# Patient Record
Sex: FEMALE | Race: BLACK OR AFRICAN AMERICAN
[De-identification: names, ages, dates, MRNs, and addresses within clinical notes are randomized per-mention and may not be internally consistent; named-entity substitution may affect disease eponyms.]

---

## 2017-01-18 ENCOUNTER — HOSPITAL ENCOUNTER (EMERGENCY)
Dept: HOSPITAL 17 - NEPE | Age: 42
Discharge: HOME | End: 2017-01-18
Payer: COMMERCIAL

## 2017-01-18 VITALS
RESPIRATION RATE: 24 BRPM | TEMPERATURE: 98.2 F | SYSTOLIC BLOOD PRESSURE: 140 MMHG | DIASTOLIC BLOOD PRESSURE: 96 MMHG | OXYGEN SATURATION: 94 % | HEART RATE: 70 BPM

## 2017-01-18 VITALS — WEIGHT: 279.99 LBS | HEIGHT: 66 IN | BODY MASS INDEX: 45 KG/M2

## 2017-01-18 VITALS — RESPIRATION RATE: 22 BRPM | OXYGEN SATURATION: 98 %

## 2017-01-18 VITALS — SYSTOLIC BLOOD PRESSURE: 132 MMHG | DIASTOLIC BLOOD PRESSURE: 85 MMHG

## 2017-01-18 DIAGNOSIS — R11.10: ICD-10-CM

## 2017-01-18 DIAGNOSIS — I10: ICD-10-CM

## 2017-01-18 DIAGNOSIS — R07.89: ICD-10-CM

## 2017-01-18 DIAGNOSIS — Z87.09: ICD-10-CM

## 2017-01-18 DIAGNOSIS — Z87.42: ICD-10-CM

## 2017-01-18 DIAGNOSIS — J20.9: Primary | ICD-10-CM

## 2017-01-18 DIAGNOSIS — Z72.0: ICD-10-CM

## 2017-01-18 PROCEDURE — 94640 AIRWAY INHALATION TREATMENT: CPT

## 2017-01-18 PROCEDURE — 94664 DEMO&/EVAL PT USE INHALER: CPT

## 2017-01-18 PROCEDURE — 71020: CPT

## 2017-01-18 PROCEDURE — 96372 THER/PROPH/DIAG INJ SC/IM: CPT

## 2017-01-18 PROCEDURE — 99283 EMERGENCY DEPT VISIT LOW MDM: CPT

## 2017-01-18 RX ADMIN — IPRATROPIUM BROMIDE AND ALBUTEROL SULFATE SCH AMPULE: .5; 3 SOLUTION RESPIRATORY (INHALATION) at 16:49

## 2017-01-18 RX ADMIN — IPRATROPIUM BROMIDE AND ALBUTEROL SULFATE SCH AMPULE: .5; 3 SOLUTION RESPIRATORY (INHALATION) at 16:50

## 2017-01-18 NOTE — RADRPT
EXAM DATE/TIME:  01/18/2017 17:50 

 

[facility] COMPARISON:     

No previous studies available for comparison.

 

                     

INDICATIONS :     

Short of breath.

                     

 

MEDICAL HISTORY :     

None.          

 

SURGICAL HISTORY :     

None.   

 

ENCOUNTER:     

Initial                                        

 

ACUITY:     

3 days      

 

PAIN SCORE:     

5/10

 

LOCATION:     

Left chest 

 

FINDINGS:     

PA and lateral views of the chest demonstrate the lungs to be symmetrically aerated without evidence 
of mass, infiltrate or effusion.  The cardiomediastinal contours are unremarkable.  Osseous structure
s are intact.

 

CONCLUSION:     No acute disease.  

 

 

 

 Allan Mi MD on January 18, 2017 at 18:11           

Board Certified Radiologist.

 This report was verified electronically.

## 2017-01-18 NOTE — PD
HPI


Chief Complaint:  Cold / Flu Symptoms


Time Seen by Provider:  16:40


Travel History


International Travel<30 days:  No


Contact w/Intl Traveler<30days:  No


Traveled to known affect area:  No





History of Present Illness


HPI


Patient is a 41-year-old female who presents emergency for evaluation of cough, 

chest congestion, chest tightness.  Patient states that the symptoms started on 

Monday.  She states that she has vomited a few times with coughing spells, 

additionally she has vomited with the coughing spells.  She reports yellow to 

green mucus production, sinus pressure.  Patient denies any fevers but reports 

chills.  Patient endorses a 26 year smoking history.  She denies any diagnosis 

of COPD or asthma but states she gets frequent bronchitis.





PFSH


Past Medical History


Diminished Hearing:  No


Gastrointestinal Disorders:  No


Hypertension:  Yes


Reproductive:  Yes (GENITAL HERPES, PELVIC INFLAMMATORY DISEASE)


Respiratory:  Yes (BRONCHITIS)


Immunizations Current:  Yes


Pregnant?:  Not Pregnant


LMP:  17


:  3


Para:  2


Miscarriage:  0


:  1


Tubal Ligation:  Yes





Past Surgical History


 Section:  Yes


Other Surgery:  Yes (SPINAL STEROID INJECTION)





Social History


Alcohol Use:  Yes (occasional)


Tobacco Use:  Yes


Substance Use:  Yes (marijuana)





Allergies-Medications


(Allergen,Severity, Reaction):  


Coded Allergies:  


     Dilaudid (Verified  Allergy, Severe, THROAT SWELLING, HIVES, 17)


Reported Meds & Prescriptions





Reported Meds & Active Scripts


Active


Proventil Hfa 6.7 GM Inh (Albuterol Sulfate) 90 Mcg/Act Aer 2 Puff INH Q6H PRN


Zithromax (Azithromycin) 250 Mg Tab 250 Mg PO AS DIRECTED


     Take 2 tabs (500 mg) on day 1 then 1 tab daily x 4 days.








Review of Systems


Except as stated in HPI:  all other systems reviewed are Neg


General / Constitutional:  Positive: Chills,  No: Fever


Eyes:  No: Visual changes


HENT:  Positive: Congestion,  No: Headaches


Cardiovascular:  No: Chest Pain or Discomfort


Respiratory:  Positive: Cough, Shortness of Breath, Wheezing


Gastrointestinal:  Positive: Vomiting,  No: Nausea, Diarrhea, Abdominal Pain


Musculoskeletal:  No: Myalgias


Neurologic:  No: Dizziness, Syncope





Physical Exam


Narrative


GENERAL: Overweight, well-developed, alert  female.  Resting 

comfortably in no acute distress.


SKIN: Warm and dry.


HEAD: Atraumatic. Normocephalic. 


EYES: Pupils equal and round. No scleral icterus. No injection or drainage. 


ENT: No nasal bleeding or discharge.  Mucous membranes pink and moist.


NECK: Trachea midline. No JVD. 


CARDIOVASCULAR: Regular rate and rhythm.  


RESPIRATORY:  Expiratory wheezing throughout, no nasal flaring, no accessory 

muscle use, no retractions.


GASTROINTESTINAL: Abdomen soft, non-tender, nondistended. Hepatic and splenic 

margins not palpable. 


MUSCULOSKELETAL: Extremities without clubbing, cyanosis, or edema. No obvious 

deformities. 


NEUROLOGICAL: Awake and alert. No obvious cranial nerve deficits.  Motor 

grossly within normal limits. Five out of 5 muscle strength in the arms and 

legs.  Normal speech.


PSYCHIATRIC: Appropriate mood and affect; insight and judgment normal.





Data


Data


Last Documented VS





Vital Signs








  Date Time  Temp Pulse Resp B/P Pulse Ox O2 Delivery O2 Flow Rate FiO2


 


17 16:46   22  98 Room Air  


 


17 14:19 98.2 70  140/96    








Orders





 Iv Access Insert/Monitor (17 16:37)


Ecg Monitoring (17 16:37)


Oximetry (17 16:37)


Oxygen Administration (17 16:37)


Chest, Pa & Lat (17 16:37)


Methylprednisolone So Succ Inj (Solumedr (17 16:45)


Albuterol-Ipratropium Neb (Duoneb Neb) (17 16:45)








MDM


Medical Decision Making


Medical Screen Exam Complete:  Yes


Emergency Medical Condition:  Yes


Interpretation(s)





Vital Signs








  Date Time  Temp Pulse Resp B/P Pulse Ox O2 Delivery O2 Flow Rate FiO2


 


17 14:19 98.2 70 24 140/96 94 Room Air  








Differential Diagnosis


Bronchitis versus pneumonia versus viral syndrome versus other


Narrative Course


Patient is a 41-year-old female who presents emergency department for 

evaluation of cough and chest congestion.  Symptoms started 2 days ago.  

Patient is afebrile, her vital signs are stable, she is well oxygenated on room 

air with a sat of 98%.  Chest x-ray shows no acute disease, patient was given 

DuoNeb 3 and reports improvement in her symptoms.  She was also given Solu-

Medrol IM.  Patient was encouraged to avoid further tobacco use, she was given 

prescriptions for albuterol nebulizers, and nebulizer machine, prednisone, and 

amoxicillin.  She was encouraged to follow-up with her primary doctor in 24-48 

hours.  She was encouraged to return to emergency department for any new or 

worsening symptoms.  Patient verbalized understanding of these instructions.  

Patient stable for discharge.





Diagnosis





 Primary Impression:  


 Acute bronchitis


 Qualified Code:  J20.9 - Acute bronchitis, unspecified organism


Referrals:  


Primary Care Physician


2 days


Patient Instructions:  Acute Bronchitis (ED), General Instructions





***Additional Instructions:


Follow-up with your primary doctor


Avoid tobacco use or marijuana use


Take medications as directed


Return to emergency department for any new or worsening symptoms


***Med/Other Pt SpecificInfo:  Prescription(s) given


Scripts


Amoxicillin 875 Mg Ybo352 Mg PO BID  10 Days  Ref 0


   Prov:Joanne BacaP         17 


Prednisone 50 Mg Tab50 Mg PO DAILY  #5 TAB  Ref 0


   Prov:Joanne Baca         17 


Albuterol Neb 0.63 Mg/3 Ml Neb0.63 Mg NEB QID NEB PRN (SHORTNESS OF BREATH) #

125 NEBULE  Ref 0


   Prov:Joanne Baca         17 


Nebulizer/Adult Mask 1 Kit Kit #1 KIT .ROUTE AS DIRECTED   Ref 0


   Prov:Joanne Baca         17 


Nebulizer 1 Mis Mis #1 EA .ROUTE AS DIRECTED   Ref 0


   Prov:Joanne Baca         17








Joanne Baca 2017 16:45

## 2017-10-20 ENCOUNTER — HOSPITAL ENCOUNTER (EMERGENCY)
Dept: HOSPITAL 17 - NED | Age: 42
Discharge: LEFT BEFORE DISCHARGE | End: 2017-10-20
Payer: SELF-PAY

## 2017-10-20 VITALS
OXYGEN SATURATION: 98 % | RESPIRATION RATE: 14 BRPM | SYSTOLIC BLOOD PRESSURE: 140 MMHG | DIASTOLIC BLOOD PRESSURE: 98 MMHG | TEMPERATURE: 98.2 F | HEART RATE: 80 BPM

## 2017-10-20 VITALS — BODY MASS INDEX: 42.52 KG/M2 | HEIGHT: 66 IN | WEIGHT: 264.55 LBS

## 2017-10-20 DIAGNOSIS — R68.89: Primary | ICD-10-CM

## 2017-10-20 PROCEDURE — 99281 EMR DPT VST MAYX REQ PHY/QHP: CPT

## 2017-11-23 ENCOUNTER — HOSPITAL ENCOUNTER (EMERGENCY)
Dept: HOSPITAL 17 - NEPK | Age: 42
Discharge: HOME | End: 2017-11-23
Payer: SELF-PAY

## 2017-11-23 VITALS
SYSTOLIC BLOOD PRESSURE: 166 MMHG | HEART RATE: 76 BPM | TEMPERATURE: 98 F | DIASTOLIC BLOOD PRESSURE: 114 MMHG | OXYGEN SATURATION: 97 % | RESPIRATION RATE: 20 BRPM

## 2017-11-23 VITALS — BODY MASS INDEX: 35.43 KG/M2 | HEIGHT: 66 IN | WEIGHT: 220.46 LBS

## 2017-11-23 DIAGNOSIS — Z79.51: ICD-10-CM

## 2017-11-23 DIAGNOSIS — Z79.899: ICD-10-CM

## 2017-11-23 DIAGNOSIS — R07.9: ICD-10-CM

## 2017-11-23 DIAGNOSIS — I10: ICD-10-CM

## 2017-11-23 DIAGNOSIS — N73.9: ICD-10-CM

## 2017-11-23 DIAGNOSIS — J20.9: Primary | ICD-10-CM

## 2017-11-23 DIAGNOSIS — Z88.5: ICD-10-CM

## 2017-11-23 DIAGNOSIS — Z72.0: ICD-10-CM

## 2017-11-23 PROCEDURE — 99284 EMERGENCY DEPT VISIT MOD MDM: CPT

## 2017-11-23 PROCEDURE — 96372 THER/PROPH/DIAG INJ SC/IM: CPT

## 2017-11-23 NOTE — PD
HPI


Chief Complaint:  Cold / Flu Symptoms


Time Seen by Provider:  12:09


Travel History


International Travel<30 days:  No


Contact w/Intl Traveler<30days:  No


Traveled to known affect area:  No





History of Present Illness


HPI


43 yo F complains of cough dry and wet for 45 days, white pasty phlegm at 

times. Slight chest pain from coughing. This morning pt coughed quite 

frequently for 6 hours today. Occasional dyspnea. PT reports hx bronchitis. + 

Fever to 103 yesterday. + Smoking until quitting 5 days prior. + Bilateral ST x 

45 days.





PFSH


Past Medical History


Cardiovascular Problems:  Yes (HTN)


Diminished Hearing:  No


Gastrointestinal Disorders:  No


Genitourinary:  Yes (pid)


Hypertension:  Yes


Reproductive:  Yes (GENITAL HERPES, PELVIC INFLAMMATORY DISEASE)


Respiratory:  Yes (BRONCHITIS)


Immunizations Current:  Yes


Pregnant?:  Not Pregnant


:  3


Para:  2


Miscarriage:  0


:  1


Tubal Ligation:  Yes





Past Surgical History


 Section:  Yes


Other Surgery:  Yes (SPINAL STEROID INJECTION)





Social History


Alcohol Use:  Yes (occasional)


Tobacco Use:  Yes


Substance Use:  Yes (marijuana)





Allergies-Medications


(Allergen,Severity, Reaction):  


Coded Allergies:  


     hydromorphone (Unverified  Allergy, Severe, THROAT SWELLING, HIVES, 8/15/17

)


Reported Meds & Prescriptions





Reported Meds & Active Scripts


Active


Tessalon Perles (Benzonatate) 100 Mg Cap 100 Mg PO TID PRN


Prednisone 50 Mg Tab 50 Mg PO DAILY


Proventil Hfa 6.7 GM Inh (Albuterol Sulfate) 90 Mcg/Act Aer 2 Puff INH Q6H PRN


Zithromax (Azithromycin) 250 Mg Tab 250 Mg PO AS DIRECTED


     Take 2 tabs (500 mg) on day 1 then 1 tab daily x 4 days.


Amoxicillin 875 Mg Tab 875 Mg PO BID 10 Days


Albuterol Neb (Albuterol Sulfate) 0.63 Mg/3 Ml Neb 0.63 Mg NEB QID NEB PRN


Nebulizer/Adult Mask (N/A) 1 Kit Kit 1 Kit .ROUTE AS DIRECTED


Nebulizer 1 Mis Mis 1 Ea .ROUTE AS DIRECTED








Review of Systems


General / Constitutional:  Positive: Fever


HENT:  Positive: Sore Throat


Respiratory:  Positive: Cough, Wheezing





Physical Exam


Narrative


GENERAL: 43 yo F, WNWD, NAD


SKIN: Warm and dry.


HEAD: Normocephalic.


THROAT: No tonsillar exudate or erythema. No PTA. 


EYES: No scleral icterus. No injection or drainage. 


NECK: Supple, trachea midline. No JVD or lymphadenopathy.


CARDIOVASCULAR: Regular rate and rhythm without murmurs, gallops, or rubs. 


RESPIRATORY: Breath sounds equal bilaterally. No accessory muscle use. Trace 

wheezing bilaterally.





Data


Data


Last Documented VS





Vital Signs








  Date Time  Temp Pulse Resp B/P (MAP) Pulse Ox O2 Delivery O2 Flow Rate FiO2


 


17 12:44        


 


17 12:05   16  99 Room Air  


 


17 11:33 98.0 76      





VS reviewed


Orders





 Orders


Ed Discharge Order (17 12:18)


Dexamethasone Inj (Decadron Inj) (17 12:30)


Azithromycin (Zithromax) (17 12:30)


Albuterol Hfa Inh (Proair Hfa Inh) (17 12:30)








MDM


Medical Decision Making


Medical Screen Exam Complete:  Yes


Emergency Medical Condition:  Yes


Medical Record Reviewed:  Yes


Differential Diagnosis


bronchitis, asthma, COPD, pna


Narrative Course


presentation concerning for atypical pneumonia or copd/asthma type process


PE/ACS, etc are considered reasonably safely excluded


Pharynigits likely postnasal drip 


Pt encouraged to continue smoking cessation efforts


scripts as below


return precautions discussed





Diagnosis





 Primary Impression:  


 Acute bronchitis


 Qualified Codes:  J20.9 - Acute bronchitis, unspecified


 Additional Impression:  


 Pharyngitis


 Qualified Codes:  J02.9 - Acute pharyngitis, unspecified


Referrals:  


[facility]


2 days


***Med/Other Pt SpecificInfo:  Prescription(s) given


Scripts


Benzonatate (Tessalon Perles) 100 Mg Cap


100 MG PO TID Y for COUGH, #10 CAP 0 Refills


   Prov: Bigg Nava MD         17 


Albuterol 6.7 GM Inh (Proventil Hfa 6.7 GM Inh) 90 Mcg/Act Aer


2 PUFF INH Q6H Y for SHORTNESS OF BREATH, #1 INHALER 0 Refills


   Prov: Bigg Nava MD         17 


Azithromycin (Zithromax) 250 Mg Tab


250 MG PO AS DIRECTED for Infection, #6 TAB 0 Refills


   Take 2 tabs (500 mg) on day 1 then 1 tab daily x 4 days.


   Prov: Bigg Nava MD         17


Disposition:  01 DISCHARGE HOME


Condition:  Stable











Bigg Nava MD 2017 12:16

## 2018-01-10 ENCOUNTER — HOSPITAL ENCOUNTER (EMERGENCY)
Dept: HOSPITAL 17 - NED | Age: 43
Discharge: LEFT BEFORE DISCHARGE | End: 2018-01-10
Payer: SELF-PAY

## 2018-01-10 VITALS — BODY MASS INDEX: 43.4 KG/M2 | HEIGHT: 66 IN | WEIGHT: 270.07 LBS

## 2018-01-10 VITALS
TEMPERATURE: 99.1 F | HEART RATE: 84 BPM | DIASTOLIC BLOOD PRESSURE: 103 MMHG | SYSTOLIC BLOOD PRESSURE: 177 MMHG | RESPIRATION RATE: 18 BRPM | OXYGEN SATURATION: 98 %

## 2018-01-10 DIAGNOSIS — I10: ICD-10-CM

## 2018-01-10 DIAGNOSIS — R11.0: ICD-10-CM

## 2018-01-10 DIAGNOSIS — R51: Primary | ICD-10-CM

## 2018-01-10 DIAGNOSIS — R09.89: ICD-10-CM

## 2018-01-10 DIAGNOSIS — R05: ICD-10-CM

## 2018-01-10 PROCEDURE — 99281 EMR DPT VST MAYX REQ PHY/QHP: CPT

## 2018-01-10 NOTE — PD
HPI


Chief Complaint:  Cold / Flu Symptoms


Time Seen by Provider:  13:56


Travel History


International Travel<30 days:  No


Contact w/Intl Traveler<30days:  No


Traveled to known affect area:  No





History of Present Illness


HPI


Pt is a 42-year-old female presenting to emergency department evaluation of 

cough, headache, nausea, diarrhea and chest congestion.  Patient states his 

symptoms started .  She reports the max temp 102 at 10:30 last night.  

She denies any abdominal pain, chest pain.  Patient is taken any over-the-

counter medications today area and her symptoms started gradually, worsening 

which is what prompted her visit to the emergency department.  There are no 

alleviating factors, there are no exacerbating factors.





PFSH


Past Medical History


Cardiovascular Problems:  Yes (HTN)


Diminished Hearing:  No


Gastrointestinal Disorders:  No


Genitourinary:  Yes (pid)


Hypertension:  Yes


Reproductive:  Yes (GENITAL HERPES, PELVIC INFLAMMATORY DISEASE)


Respiratory:  Yes (BRONCHITIS)


Immunizations Current:  Yes


Pregnant?:  Not Pregnant


LMP:  now


:  3


Para:  2


Miscarriage:  0


:  1


Tubal Ligation:  Yes





Past Surgical History


 Section:  Yes


Other Surgery:  Yes (SPINAL STEROID INJECTION)





Social History


Alcohol Use:  Yes (occasional)


Tobacco Use:  Yes


Substance Use:  Yes (marijuana)





Allergies-Medications


(Allergen,Severity, Reaction):  


Coded Allergies:  


     hydromorphone (Unverified  Allergy, Severe, THROAT SWELLING, HIVES, 8/15/17

)


Reported Meds & Prescriptions





Reported Meds & Active Scripts


Active


Tessalon Perles (Benzonatate) 100 Mg Cap 100 Mg PO TID PRN


Proventil Hfa 6.7 GM Inh (Albuterol Sulfate) 90 Mcg/Act Aer 2 Puff INH Q6H PRN


Zithromax (Azithromycin) 250 Mg Tab 250 Mg PO AS DIRECTED


     Take 2 tabs (500 mg) on day 1 then 1 tab daily x 4 days.


Amoxicillin 875 Mg Tab 875 Mg PO BID 10 Days


Albuterol Neb (Albuterol Sulfate) 0.63 Mg/3 Ml Neb 0.63 Mg NEB QID NEB PRN


Nebulizer/Adult Mask (N/A) 1 Kit Kit 1 Kit .ROUTE AS DIRECTED


Nebulizer 1 Mis Mis 1 Ea .ROUTE AS DIRECTED








Review of Systems


Except as stated in HPI:  all other systems reviewed are Neg


General / Constitutional:  Positive: Fever, Chills


HENT:  Positive: Headaches, Congestion


Cardiovascular:  No: Chest Pain or Discomfort


Respiratory:  Positive: Cough


Gastrointestinal:  No: Nausea, Vomiting, Abdominal Pain


Musculoskeletal:  Positive: Myalgias





Physical Exam


Narrative


GENERAL: Well-developed, well-nourished, alert female.  Presenting in no acute 

distress


SKIN: Warm and dry.


HEAD: Normocephalic.


EYES: No scleral icterus. No injection or drainage. 


CARDIOVASCULAR: Regular rate 


RESPIRATORY: No accessory muscle use.





Data


Data


Last Documented VS





Vital Signs








  Date Time  Temp Pulse Resp B/P (MAP) Pulse Ox O2 Delivery O2 Flow Rate FiO2


 


1/10/18 16:47        


 


1/10/18 13:33 99.1 84 18  98 Room Air  











MDM


Medical Decision Making


Medical Screen Exam Complete:  Yes


Emergency Medical Condition:  Yes


Interpretation(s)





Vital Signs








  Date Time  Temp Pulse Resp B/P (MAP) Pulse Ox O2 Delivery O2 Flow Rate FiO2


 


1/10/18 16:47        


 


1/10/18 13:33 99.1 84 18 177/103 (127) 98 Room Air  








Differential Diagnosis


Viral syndrome versus influenza versus strep pharyngitis versus other


Narrative Course


Patient is a 42-year-old female presenting to the emergency department for 

evaluation of cold of flu symptoms.  Patient is awaiting bed placement.





She was called to be bedded, she was no longer in the emergency department.  

Patient left AMA





Diagnosis





 Primary Impression:  


 Left against medical advice











Joanne Baca Arnold 10, 2018 17:29

## 2018-01-28 ENCOUNTER — HOSPITAL ENCOUNTER (EMERGENCY)
Dept: HOSPITAL 17 - NEPD | Age: 43
Discharge: HOME | End: 2018-01-28
Payer: SELF-PAY

## 2018-01-28 VITALS
TEMPERATURE: 98.1 F | SYSTOLIC BLOOD PRESSURE: 130 MMHG | OXYGEN SATURATION: 97 % | HEART RATE: 80 BPM | DIASTOLIC BLOOD PRESSURE: 96 MMHG | RESPIRATION RATE: 15 BRPM

## 2018-01-28 VITALS — HEIGHT: 66 IN | WEIGHT: 286.6 LBS | BODY MASS INDEX: 46.06 KG/M2

## 2018-01-28 VITALS — OXYGEN SATURATION: 98 %

## 2018-01-28 DIAGNOSIS — J06.9: Primary | ICD-10-CM

## 2018-01-28 DIAGNOSIS — Z72.0: ICD-10-CM

## 2018-01-28 LAB
BASOPHILS # BLD AUTO: 0.1 TH/MM3 (ref 0–0.2)
BASOPHILS NFR BLD: 0.8 % (ref 0–2)
BUN SERPL-MCNC: 9 MG/DL (ref 7–18)
CALCIUM SERPL-MCNC: 8.7 MG/DL (ref 8.5–10.1)
CHLORIDE SERPL-SCNC: 104 MEQ/L (ref 98–107)
CREAT SERPL-MCNC: 0.78 MG/DL (ref 0.5–1)
EOSINOPHIL # BLD: 0 TH/MM3 (ref 0–0.4)
EOSINOPHIL NFR BLD: 0.4 % (ref 0–4)
ERYTHROCYTE [DISTWIDTH] IN BLOOD BY AUTOMATED COUNT: 15.6 % (ref 11.6–17.2)
GFR SERPLBLD BASED ON 1.73 SQ M-ARVRAT: 98 ML/MIN (ref 89–?)
GLUCOSE SERPL-MCNC: 104 MG/DL (ref 74–106)
HCO3 BLD-SCNC: 25.5 MEQ/L (ref 21–32)
HCT VFR BLD CALC: 39.4 % (ref 35–46)
HGB BLD-MCNC: 13.1 GM/DL (ref 11.6–15.3)
LYMPHOCYTES # BLD AUTO: 2.1 TH/MM3 (ref 1–4.8)
LYMPHOCYTES NFR BLD AUTO: 34.1 % (ref 9–44)
MCH RBC QN AUTO: 27.1 PG (ref 27–34)
MCHC RBC AUTO-ENTMCNC: 33.2 % (ref 32–36)
MCV RBC AUTO: 81.7 FL (ref 80–100)
MONOCYTE #: 0.9 TH/MM3 (ref 0–0.9)
MONOCYTES NFR BLD: 14.9 % (ref 0–8)
NEUTROPHILS # BLD AUTO: 3.1 TH/MM3 (ref 1.8–7.7)
NEUTROPHILS NFR BLD AUTO: 49.8 % (ref 16–70)
PLATELET # BLD: 290 TH/MM3 (ref 150–450)
PMV BLD AUTO: 7.9 FL (ref 7–11)
RBC # BLD AUTO: 4.83 MIL/MM3 (ref 4–5.3)
SODIUM SERPL-SCNC: 137 MEQ/L (ref 136–145)
TROPONIN I SERPL-MCNC: 0.03 NG/ML (ref 0.02–0.05)
WBC # BLD AUTO: 6.2 TH/MM3 (ref 4–11)

## 2018-01-28 PROCEDURE — 80048 BASIC METABOLIC PNL TOTAL CA: CPT

## 2018-01-28 PROCEDURE — 71045 X-RAY EXAM CHEST 1 VIEW: CPT

## 2018-01-28 PROCEDURE — 82550 ASSAY OF CK (CPK): CPT

## 2018-01-28 PROCEDURE — 93005 ELECTROCARDIOGRAM TRACING: CPT

## 2018-01-28 PROCEDURE — 87804 INFLUENZA ASSAY W/OPTIC: CPT

## 2018-01-28 PROCEDURE — 82552 ASSAY OF CPK IN BLOOD: CPT

## 2018-01-28 PROCEDURE — 84484 ASSAY OF TROPONIN QUANT: CPT

## 2018-01-28 PROCEDURE — 96374 THER/PROPH/DIAG INJ IV PUSH: CPT

## 2018-01-28 PROCEDURE — 85025 COMPLETE CBC W/AUTO DIFF WBC: CPT

## 2018-01-28 PROCEDURE — 99285 EMERGENCY DEPT VISIT HI MDM: CPT

## 2018-01-28 NOTE — PD
HPI


Chief Complaint:  Chest Pain


Time Seen by Provider:  13:17


Travel History


International Travel<30 days:  No


Contact w/Intl Traveler<30days:  No


Traveled to known affect area:  No





History of Present Illness


HPI


42-year-old female presents to the emergency department for evaluation of cough

, congestion, chest pain with coughing and deep breathing that started one 

month ago.  Patient reports history of bronchitis, HTN.  She states she has 

been running fevers for the past 2 days up to 104.  She is afebrile at this 

time.  She states she was diagnosed with pneumonia in November.  She states she 

officially quit smoking this morning.  No abdominal pain.  No nausea, vomiting, 

diarrhea.  Cough, deep breathing exacerbates pain.  Resting will help alleviate 

pain.  Moderate severity.  She is not on birth control, reports hx of tubal 

ligation.  No leg edema.  No hemoptysis.  No recent surgery or travel.





PFSH


Past Medical History


Cardiovascular Problems:  Yes (HTN)


Diminished Hearing:  No


Gastrointestinal Disorders:  No


Genitourinary:  Yes (pid)


Hypertension:  Yes


Reproductive:  Yes (GENITAL HERPES, PELVIC INFLAMMATORY DISEASE)


Respiratory:  Yes (BRONCHITIS)


Immunizations Current:  Yes


Pregnant?:  Not Pregnant


LMP:  2018


:  3


Para:  2


Miscarriage:  0


:  1


Tubal Ligation:  Yes





Past Surgical History


 Section:  Yes


Other Surgery:  Yes (SPINAL STEROID INJECTION)





Social History


Alcohol Use:  Yes (occasional)


Tobacco Use:  Yes


Substance Use:  Yes (marijuana)





Allergies-Medications


(Allergen,Severity, Reaction):  


Coded Allergies:  


     hydromorphone (Unverified  Allergy, Severe, THROAT SWELLING, HIVES, 18

)


Reported Meds & Prescriptions





Reported Meds & Active Scripts


Active


Benzonatate 200 Mg Cap 200 Mg PO TID PRN


Prednisone 20 Mg Tab 40 Mg PO DAILY 5 Days


Tessalon Perles (Benzonatate) 100 Mg Cap 100 Mg PO TID PRN


Proventil Hfa 6.7 GM Inh (Albuterol Sulfate) 90 Mcg/Act Aer 2 Puff INH Q6H PRN


Zithromax (Azithromycin) 250 Mg Tab 250 Mg PO AS DIRECTED


     Take 2 tabs (500 mg) on day 1 then 1 tab daily x 4 days.


Amoxicillin 875 Mg Tab 875 Mg PO BID 10 Days


Albuterol Neb (Albuterol Sulfate) 0.63 Mg/3 Ml Neb 0.63 Mg NEB QID NEB PRN


Nebulizer/Adult Mask (N/A) 1 Kit Kit 1 Kit .ROUTE AS DIRECTED


Nebulizer 1 Mis Mis 1 Ea .ROUTE AS DIRECTED








Review of Systems


Except as stated in HPI:  all other systems reviewed are Neg





Physical Exam


Narrative


GENERAL: Well-nourished, well-developed female patient, ambulatory.  Afebrile.


SKIN: Focused skin assessment warm/dry.


HEAD: Normocephalic.  Atraumatic.


ENT: Mucosa pink and moist. No erythema or exudates. No uvular edema. No uvular

, palatal, or tonsillar deviation. Airway patent. Nasal turbinates appear 

normal without nasal blood, purulent drainage or septal hematoma.  Bilateral 

tympanic membranes are clear without erythema or perforation.


EYES: No scleral icterus. No injection or drainage. 


NECK: Supple, trachea midline. No JVD or lymphadenopathy.


CARDIOVASCULAR: Regular rate and rhythm without murmurs, gallops, or rubs. 


RESPIRATORY: Breath sounds equal bilaterally. No accessory muscle use.  Lungs 

sounds are clear to auscultation.


GASTROINTESTINAL: Abdomen soft, non-tender, nondistended. 


MUSCULOSKELETAL: No cyanosis, or edema.  Chest wall pain is easily reproduced 

with cough, movement.


BACK: Nontender without obvious deformity. No CVA tenderness.





Data


Data


Last Documented VS





Vital Signs








  Date Time  Temp Pulse Resp B/P (MAP) Pulse Ox O2 Delivery O2 Flow Rate FiO2


 


18 13:57     98   


 


18 10:59 98.1 80 15     








Orders





 Orders


Electrocardiogram (18 )


Complete Blood Count With Diff (18 13:27)


Basic Metabolic Panel (Bmp) (18 13:27)


Ckmb (Isoenzyme) Profile (18 13:27)


Troponin I (18 13:27)


Influenzae A/B Antigen (18 13:27)


Iv Access Insert/Monitor (18 13:27)


Ecg Monitoring (18 13:27)


Oximetry (18 13:27)


Oxygen Administration (18 13:27)


Chest, Single Ap (18 13:27)


Sodium Chloride 0.9% Flush (Ns Flush) (18 13:30)


Ketorolac Inj (Toradol Inj) (18 13:45)


CKMB (18 13:50)


CKMB% (18 13:50)


Ed Discharge Order (18 14:53)





Labs





Laboratory Tests








Test


  18


13:50


 


White Blood Count 6.2 TH/MM3 


 


Red Blood Count 4.83 MIL/MM3 


 


Hemoglobin 13.1 GM/DL 


 


Hematocrit 39.4 % 


 


Mean Corpuscular Volume 81.7 FL 


 


Mean Corpuscular Hemoglobin 27.1 PG 


 


Mean Corpuscular Hemoglobin


Concent 33.2 % 


 


 


Red Cell Distribution Width 15.6 % 


 


Platelet Count 290 TH/MM3 


 


Mean Platelet Volume 7.9 FL 


 


Neutrophils (%) (Auto) 49.8 % 


 


Lymphocytes (%) (Auto) 34.1 % 


 


Monocytes (%) (Auto) 14.9 % 


 


Eosinophils (%) (Auto) 0.4 % 


 


Basophils (%) (Auto) 0.8 % 


 


Neutrophils # (Auto) 3.1 TH/MM3 


 


Lymphocytes # (Auto) 2.1 TH/MM3 


 


Monocytes # (Auto) 0.9 TH/MM3 


 


Eosinophils # (Auto) 0.0 TH/MM3 


 


Basophils # (Auto) 0.1 TH/MM3 


 


CBC Comment DIFF FINAL 


 


Differential Comment  


 


Blood Urea Nitrogen 9 MG/DL 


 


Creatinine 0.78 MG/DL 


 


Random Glucose 104 MG/DL 


 


Calcium Level 8.7 MG/DL 


 


Sodium Level 137 MEQ/L 


 


Potassium Level 3.4 MEQ/L 


 


Chloride Level 104 MEQ/L 


 


Carbon Dioxide Level 25.5 MEQ/L 


 


Anion Gap 8 MEQ/L 


 


Estimat Glomerular Filtration


Rate 98 ML/MIN 


 


 


Total Creatine Kinase 282 U/L 


 


Creatine Kinase MB


  LESS THAN 0.5


NG/ML


 


Creatine Kinase MB % 0.2 % 


 


Troponin I 0.03 NG/ML 











MDM


Medical Decision Making


Medical Screen Exam Complete:  Yes


Emergency Medical Condition:  Yes


Medical Record Reviewed:  Yes


Differential Diagnosis


Pneumonia versus URI versus bronchitis versus influenza


Narrative Course


42-year-old female presents to the emergency department for evaluation of 

respiratory symptoms for one month.  She appears well on exam.  CBC, BMP, CK, 

troponin, and influenza, chest x-ray are ordered and pending.  EKG shows sinus 

rhythm, heart rate 82, no acute ST changes.  No risk factors for PE.  





CBC shows no acute abnormality.  BMP shows no acute abnormality.  CK is 282.  

Troponin is 0.03.  Influenza is negative.  Chest x-ray shows no acute 

cardiopulmonary disease.





Physical and laboratory findings are reassuring.  Patient will be discharged 

with a prescription for prednisone, benzonatate capsules.





The patient was discharged in stable condition with instructions, including 

return instructions and follow up instructions.





Diagnosis





 Primary Impression:  


 Upper respiratory infection


 Qualified Codes:  J06.9 - Acute upper respiratory infection, unspecified


Referrals:  


Primary Care Physician


2 days


Patient Instructions:  General Instructions, Upper Respiratory Infection (ED)


Departure Forms:  Tests/Procedures, Work Release   Enter return to work date:  

2018





***Additional Instructions:  


Take prednisone as directed until gone.


Take benzonatate capsules as directed as needed for cough.


Follow-up with your primary care physician.


Return to the emergency department for any acute worsening of symptoms.


***Med/Other Pt SpecificInfo:  Prescription(s) given


Scripts


Ibuprofen (Ibuprofen) 800 Mg Tab


800 MG PO TID Y for PAIN SCALE 1 TO 10, #21 TAB 0 Refills


   Prov: Snow Lam         18 


Benzonatate (Benzonatate) 200 Mg Cap


200 MG PO TID Y for COUGH, #21 CAP 0 Refills


   Prov: Snow Lam         18 


Prednisone (Prednisone) 20 Mg Tab


40 MG PO DAILY for 5 Days, #10 TAB 0 Refills


   Prov: Snow Lam         18


Disposition:  01 DISCHARGE HOME


Condition:  Stable











Snow Lam 2018 13:30

## 2018-01-28 NOTE — RADRPT
EXAM DATE/TIME:  01/28/2018 13:48 

 

[facility] COMPARISON:     

CHEST SINGLE AP, March 14, 2016, 10:08.

 

                     

INDICATIONS :     

Short of breath, anterior and posterior chest pain

                     

 

MEDICAL HISTORY :     

Hypertension.       Bronchitis, pneumonia   

 

SURGICAL HISTORY :     

None.   

 

ENCOUNTER:     

Initial                                        

 

ACUITY:     

3 days      

PAIN SCORE:     8/10

LOCATION:      chest 

 

FINDINGS:     

Single AP view of the chest. The lungs are clear. Cardiomediastinal silhouette within normal limits. 
No evidence of pleural effusion or pneumothorax.

 

CONCLUSION:         No acute cardiopulmonary disease identified.

 

 

 

 Eric Merida MD on January 28, 2018 at 14:19           

Board Certified Radiologist.

 This report was verified electronically.

## 2018-01-29 NOTE — EKG
Date Performed: 01/28/2018       Time Performed: 11:09:49

 

PTAGE:      42 years

 

EKG:      Sinus rhythm 

 

 Since previous tracing, no significant change noted NORMAL ECG

 

PREVIOUS TRACING       : 03/14/2016 09.45

 

DOCTOR:   Kanu Ragland  Interpretating Date/Time  01/29/2018 19:37:11

## 2018-04-06 ENCOUNTER — HOSPITAL ENCOUNTER (EMERGENCY)
Dept: HOSPITAL 17 - PHED | Age: 43
Discharge: HOME | End: 2018-04-06
Payer: SELF-PAY

## 2018-04-06 VITALS
DIASTOLIC BLOOD PRESSURE: 92 MMHG | TEMPERATURE: 98.3 F | RESPIRATION RATE: 18 BRPM | OXYGEN SATURATION: 96 % | SYSTOLIC BLOOD PRESSURE: 159 MMHG | HEART RATE: 78 BPM

## 2018-04-06 VITALS — HEIGHT: 66 IN | WEIGHT: 279.99 LBS | BODY MASS INDEX: 45 KG/M2

## 2018-04-06 VITALS
RESPIRATION RATE: 18 BRPM | OXYGEN SATURATION: 98 % | DIASTOLIC BLOOD PRESSURE: 70 MMHG | HEART RATE: 78 BPM | SYSTOLIC BLOOD PRESSURE: 119 MMHG

## 2018-04-06 VITALS — DIASTOLIC BLOOD PRESSURE: 84 MMHG | SYSTOLIC BLOOD PRESSURE: 156 MMHG

## 2018-04-06 DIAGNOSIS — J45.909: ICD-10-CM

## 2018-04-06 DIAGNOSIS — Z88.5: ICD-10-CM

## 2018-04-06 DIAGNOSIS — F17.200: ICD-10-CM

## 2018-04-06 DIAGNOSIS — H04.331: Primary | ICD-10-CM

## 2018-04-06 DIAGNOSIS — I10: ICD-10-CM

## 2018-04-06 DIAGNOSIS — F12.90: ICD-10-CM

## 2018-04-06 DIAGNOSIS — F32.9: ICD-10-CM

## 2018-04-06 PROCEDURE — 99283 EMERGENCY DEPT VISIT LOW MDM: CPT

## 2018-04-06 NOTE — PD
HPI


Chief Complaint:  Eye Problems/Injury


Time Seen by Provider:  03:30


Travel History


International Travel<30 days:  No


Contact w/Intl Traveler<30days:  No


Traveled to known affect area:  No





History of Present Illness


HPI


The patient is a 42-year-old female that complains of right eye drainage and 

irritation for 2 days.  The pain is located on the medial and temporal corners 

of the eye.  She states the pain is a 5/10 and aching pain.  She has not been 

using warm compresses.  She denies any foreign body sensation in the eye.  Her 

left eye is noninfected.





PFSH


Past Medical History


Asthma:  Yes


Depression:  Yes


Cardiovascular Problems:  Yes (HTN)


Diminished Hearing:  No


Gastrointestinal Disorders:  No


Genitourinary:  Yes (pid)


Hypertension:  Yes


Reproductive:  Yes (GENITAL HERPES, PELVIC INFLAMMATORY DISEASE)


Respiratory:  Yes (BRONCHITIS)


Immunizations Current:  Yes


Influenza Vaccination:  No


Pregnant?:  Not Pregnant


LMP:  18


:  3


Para:  2


Miscarriage:  0


:  1


Tubal Ligation:  Yes





Past Surgical History


 Section:  Yes


Other Surgery:  Yes (SPINAL STEROID INJECTION)





Social History


Alcohol Use:  Yes (occasional)


Tobacco Use:  Yes (1 pack every 2 days)


Substance Use:  Yes (marijuana)





Allergies-Medications


(Allergen,Severity, Reaction):  


Coded Allergies:  


     hydromorphone (Verified  Allergy, Severe, THROAT SWELLING, HIVES, 18)


Reported Meds & Prescriptions





Reported Meds & Active Scripts


Active


Amoxicillin 500 Mg Cap 500 Mg PO TID


Tobramycin Opth Drops 0.3 % Soln 1 Drop RIGHT EYE Q4H








Review of Systems


Except as stated in HPI:  all other systems reviewed are Neg





Physical Exam


Narrative


GENERAL: Well-nourished, well-developed patient who is asleep at the time I 

encounter the patient in minimal distress with her right eye discomfort.  Her 

vital signs show initial blood pressure 159/92 but repeat blood pressure is 119/

70 and the rest of her vital signs are normal.


SKIN: Focused skin assessment warm/dry.


HEAD: Normocephalic.


EYES: No scleral icterus.  There is minimal injection and a clear drainage in 

the right eye.  The left eye shows no drainage but does show some minimal 

injection almost identical to the right eye.


NECK: Supple, trachea midline. No JVD or lymphadenopathy.


CARDIOVASCULAR: Regular rate and rhythm without murmurs, gallops, or rubs. 


RESPIRATORY: Breath sounds equal bilaterally. No accessory muscle use.


GASTROINTESTINAL: Abdomen soft, non-tender, nondistended. 


MUSCULOSKELETAL: No cyanosis, or edema. 


BACK: Nontender without obvious deformity. No CVA tenderness.





Data


Data


Last Documented VS





Vital Signs








  Date Time  Temp Pulse Resp B/P (MAP) Pulse Ox O2 Delivery O2 Flow Rate FiO2


 


18 02:01   18     


 


18 01:57  78  119/70 (86) 98   


 


18 01:01 98.3       








Orders





 Orders


Tobramycin 0.3% Opth Soln (Tobrex 0.3% O (18 03:45)


Amoxicillin (Trimox) (18 04:00)


Ed Discharge Order (18 03:51)








[facility]


Medical Decision Making


Medical Screen Exam Complete:  Yes


Emergency Medical Condition:  Yes


Medical Record Reviewed:  Yes


Differential Diagnosis


Conjunctivitis, canaliculitis, infection lacrimal duct


Narrative Course


The patient appears to have a minimal canaliculitis and a possible infection of 

the lacrimal duct.





Diagnosis





 Primary Impression:  


 Canaliculitis, lacrimal, acute





***Additional Instructions:  


Warm compresses are probably the most important part of the treatment.  This is 

every 4 hours followed by the antibiotic drops.  Follow-up with ophthalmology 

if you have any problems.


***Med/Other Pt SpecificInfo:  Prescription(s) given


Scripts


Amoxicillin (Amoxicillin) 500 Mg Cap


500 MG PO TID for Infection, #30 CAP 0 Refills


   Prov: Mynor Madrigal MD         18 


Tobramycin Opth Drops (Tobramycin Opth Drops) 0.3 % Soln


1 DROP RIGHT EYE Q4H for Infection, #1 BOTTLE 0 Refills


   Prov: Mynor Madrigal MD         18


Disposition:  01 DISCHARGE HOME


Condition:  Stable











Mynor Madrigal MD 2018 03:35

## 2018-04-30 ENCOUNTER — HOSPITAL ENCOUNTER (EMERGENCY)
Dept: HOSPITAL 17 - NEPK | Age: 43
Discharge: HOME | End: 2018-04-30
Payer: SELF-PAY

## 2018-04-30 VITALS
HEART RATE: 77 BPM | OXYGEN SATURATION: 95 % | SYSTOLIC BLOOD PRESSURE: 168 MMHG | RESPIRATION RATE: 20 BRPM | TEMPERATURE: 99 F | DIASTOLIC BLOOD PRESSURE: 83 MMHG

## 2018-04-30 VITALS — HEIGHT: 65 IN | WEIGHT: 270.57 LBS | BODY MASS INDEX: 45.08 KG/M2

## 2018-04-30 DIAGNOSIS — J03.00: Primary | ICD-10-CM

## 2018-04-30 DIAGNOSIS — F17.200: ICD-10-CM

## 2018-04-30 DIAGNOSIS — F12.90: ICD-10-CM

## 2018-04-30 PROCEDURE — 87880 STREP A ASSAY W/OPTIC: CPT

## 2018-04-30 PROCEDURE — 96374 THER/PROPH/DIAG INJ IV PUSH: CPT

## 2018-04-30 PROCEDURE — 96361 HYDRATE IV INFUSION ADD-ON: CPT

## 2018-04-30 PROCEDURE — 99284 EMERGENCY DEPT VISIT MOD MDM: CPT

## 2018-04-30 PROCEDURE — 96375 TX/PRO/DX INJ NEW DRUG ADDON: CPT

## 2018-04-30 RX ADMIN — PHENYTOIN SODIUM SCH MLS/HR: 50 INJECTION INTRAMUSCULAR; INTRAVENOUS at 18:04

## 2018-04-30 RX ADMIN — PHENYTOIN SODIUM SCH MLS/HR: 50 INJECTION INTRAMUSCULAR; INTRAVENOUS at 17:01

## 2018-04-30 NOTE — PD
HPI


Chief Complaint:  ENT Complaint


Time Seen by Provider:  15:46


Travel History


International Travel<30 days:  No


Contact w/Intl Traveler<30days:  No


Traveled to known affect area:  No





History of Present Illness


HPI


42-year-old female presents for evaluation of sore throat.  Symptoms started 

this morning.  She reports pain when swallowing, no alleviating factors.  She 

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  Denies any fevers

, chills, rash, cough or congestion.  She has no other complaints at this time.





PFSH


Past Medical History


Asthma:  Yes


Depression:  Yes


Cardiovascular Problems:  Yes (HTN)


Diminished Hearing:  No


Gastrointestinal Disorders:  No


Genitourinary:  Yes (pid)


Hypertension:  Yes


Reproductive:  Yes (GENITAL HERPES, PELVIC INFLAMMATORY DISEASE)


Respiratory:  Yes (BRONCHITIS)


Immunizations Current:  Yes


Pregnant?:  Not Pregnant


:  3


Para:  2


Miscarriage:  0


:  1


Tubal Ligation:  Yes





Past Surgical History


 Section:  Yes


Other Surgery:  Yes (SPINAL STEROID INJECTION)





Social History


Alcohol Use:  Yes (occasional)


Tobacco Use:  Yes (1 pack every 2 days)


Substance Use:  Yes (marijuana)





Allergies-Medications


(Allergen,Severity, Reaction):  


Coded Allergies:  


     hydromorphone (Verified  Allergy, Severe, THROAT SWELLING, HIVES, 18)


Reported Meds & Prescriptions





Reported Meds & Active Scripts


Active


Prednisone 20 Mg Tab 20 Mg PO BID 5 Days


Amoxicillin 875 Mg Tab 875 Mg PO BID 10 Days


Amoxicillin 500 Mg Cap 500 Mg PO TID


Tobramycin Opth Drops 0.3 % Soln 1 Drop RIGHT EYE Q4H








Review of Systems


Except as stated in HPI:  all other systems reviewed are Neg





Physical Exam


Narrative


GENERAL: Well-developed well-nourished female no acute distress


SKIN: Warm and dry.


HEAD: Atraumatic. Normocephalic. 


EYES: Pupils equal and round. No scleral icterus. No injection or drainage. 


ENT: No nasal bleeding or discharge.  Mucous membranes pink and moist.  

Significant oral pharyngeal erythema, tonsillar hypertrophy and exudate 

formation.  Uvula is midline with no mass-effect.


NECK: Trachea midline. No JVD.  No lymphadenopathy.


CARDIOVASCULAR: Regular rate and rhythm.  No murmur appreciated.


RESPIRATORY: No accessory muscle use. Clear to auscultation. Breath sounds 

equal bilaterally. 


MUSCULOSKELETAL: No obvious deformities. No clubbing.  No cyanosis.  No edema. 


NEUROLOGICAL: Awake and alert. No obvious cranial nerve deficits.  Motor 

grossly within normal limits. Normal speech.





Data


Data


Last Documented VS





Vital Signs








  Date Time  Temp Pulse Resp B/P (MAP) Pulse Ox O2 Delivery O2 Flow Rate FiO2


 


18 14:48 99.0 77 20 168/83 (111) 95   








Orders





 Orders


Dexamethasone Inj (Decadron Inj) (18 16:00)


Ketorolac Inj (Toradol Inj) (18 16:00)


Group A Rapid Strep Screen (18 15:46)


Iv Access Insert/Monitor (18 15:46)


Sodium Chlor 0.9% 1000 Ml Inj (Ns 1000 M (18 15:46)


Ketorolac Inj (Toradol Inj) (18 16:30)


Amoxicillin (Trimox) (18 18:00)


Ed Discharge Order (18 18:01)








MDM


Medical Decision Making


Medical Screen Exam Complete:  Yes


Emergency Medical Condition:  Yes


Medical Record Reviewed:  Yes


Differential Diagnosis


Tonsillitis, peritonsillar abscess, exudative pharyngitis, infectious 

mononucleosis


Narrative Course


42-year-old female here with 1 day history of sore throat.  Rapid strep screen 

ordered.  The patient will be given IV fluids, Toradol and Decadron.





Rapid strep screen is positive.  Upon reexamination the patient reports 

significant improvement in symptoms.  She will be started on amoxicillin, 

prednisone.





Diagnosis





 Primary Impression:  


 Streptococcal tonsillitis


Departure Forms:  Tests/Procedures, Work Release   Enter return to work date:  

May 4, 2018





***Additional Instructions:  


Medication as prescribed.  Take Tylenol Motrin for pain and fever per dosing 

instructions on the bottle.  Stay well hydrated and well-nourished.  Return for 

any emergent medical conditions.


***Med/Other Pt SpecificInfo:  Prescription(s) given


Scripts


Prednisone (Prednisone) 20 Mg Tab


20 MG PO BID for 5 Days, #10 TAB 0 Refills


   Prov: Bigg Nava MD         18 


Amoxicillin (Amoxicillin) 875 Mg Tab


875 MG PO BID for Infection for 10 Days, #20 TAB 0 Refills


   Prov: Bigg Nava MD         18


Disposition:  01 DISCHARGE HOME


Condition:  Stable











Ho León 2018 15:54